# Patient Record
Sex: FEMALE | Race: WHITE | NOT HISPANIC OR LATINO | URBAN - METROPOLITAN AREA
[De-identification: names, ages, dates, MRNs, and addresses within clinical notes are randomized per-mention and may not be internally consistent; named-entity substitution may affect disease eponyms.]

---

## 2023-08-04 ENCOUNTER — TELEPHONE (OUTPATIENT)
Dept: OTOLARYNGOLOGY | Facility: CLINIC | Age: 68
End: 2023-08-04

## 2023-08-04 NOTE — TELEPHONE ENCOUNTER
Phone call to pt.  Left message that referral to Dr Shi for evaluation of nasal obstruction was received and asked pt to call back to discuss and schedule appointment if desired.  Lori Lema RN

## 2023-08-15 NOTE — TELEPHONE ENCOUNTER
Called and scheduled appt 9-7-23 Dr Yuridia Jones [facility]

## 2023-08-17 ENCOUNTER — TRANSCRIBE ORDERS (OUTPATIENT)
Dept: OTHER | Age: 68
End: 2023-08-17

## 2023-08-17 DIAGNOSIS — M95.0 NASAL DEFORMITY, ACQUIRED: Primary | ICD-10-CM

## 2023-09-25 NOTE — PROGRESS NOTES
Facial Plastic and Reconstructive Surgery Consultation  Department of Otolaryngology  [facility]    Clare Burgess  : 1955   MRN: [id number]    Dara Pond MD   [address] [id number]     Dear Doctor Dejah,    Thank you for asking me to see your patient, Ms. Clare Burgess, in consultation to evaluate her nasal obstruction.  Today I had the pleasure of seeing her at the Facial Plastic and Reconstructive Surgery Clinic in the Department of Otolaryngology at [facility].        IMPRESSION & RECOMMENDATIONS  1.) Bilateral (R>L) nasal obstruction from the following anatomic causes: septal deviation to the right  in the region of the mid-septum, nasal valve collapse from right lower lateral cartilage concavity, and turbinate hypertrophy  Other pertinent nasal/facial anatomy: dorsum frontal view: deviate left in the middle and lower 1/3 (nasal bones midline), nasal bone length: short, and tip shape: asymmetric with indentation of the right nasal tip from the LLC concavity  -10/5/23: nasal endoscopy- Right mid septal deviation contacting the lateral nasal wall and creating such a significant obstruction.  Outcomes: NOSE score: 8 on 23.  Medical Decision Making (MDM): (stable chronic problem or illness).   The nasal obstruction results from the anatomic abnormalities described above. I recommend a course of medical therapy using steroid nasal sprays. I have asked Ms. Burgess to follow up with me in 4-6 weeks to update me on her response to medical therapy. If medical treatment proves ineffective, I would recommend nasal surgery to address the anatomic abnormalities described above. Rhinoplasty is a medically necessary component of the planned surgical treatment plan to address the components of her nasal obstruction caused by the external nasal deformity and could not be addressed with septoplasty alone.   Care Checklist:  _Nasal steroid trial, then  follow up in 4-6 weeks.  _Observe nasal function and nasal cycle.  _Administer NOSE evaluation at every visit.   _If medical treatment proves ineffective, operative plan includes: functional septorhinoplasty (CPT 37948) septoplasty, excision of concave right LLC with inversion and alar batten or strut grafting, right  grafting, possible bilateral  grafting, and turbinate reduction (CPT 17422 turbinate out-fracture).  _Conchal cartilage harvest and grafting from either or both ears (CPT 32107) and/or possible temporal or mastoid fascia harvest and grafting from either side (CPT 08550) would only be performed if there were unexpected findings during the nasal surgery and additional grafting materials are necessary to rebuild the nose. But preoperatively, the need for graft harvest and additional grafting is unlikely.   _Surgery is expected to only provide a partial benefit in breathing. Nasal steroid spray use and even further allergy treatment may be needed after surgery to obtain an optimal result.   _Expected outcome: we discussed the expected outcome of a right nasal sidewall that is thicker and firmer that may need additional surgery to thin the grafts if they are determined to be too thick after a reasonable period of healing. We will also try to creat a straighter nose but it will not be perfectly straight. This is a challenge because of her short nasal bones. Palpable irregularities are expected. The patient accepted this expected outcome.     2.) Allegic rhinitis.  Medical Decision Making (MDM): (stable chronic problem or illness).   Allergies likely play a role in her nasal obstruction and that surgery will not improve her allergic rhinitis.   Care Checklist:   _Expected outcome after any nasal surgery: some degree of nasal obstruction will be present postoperatively due to allergies. You will likely need nasal steroid sprays after a successful nasal surgery and may need ongoing care by an  allergist.    Background/Connection:   Preferred name = Hina  What is most important to know about you as a person? (No medical information) I'm a caring person    Photographs: [facility] consents signed 10/5/23.    It has been a pleasure to participate in the care of Ms. Burgess. Thank you for this kind referral.       Sincerely,    Red Shi MD  Facial Plastic and Reconstructive Surgeon   Department of Otolaryngology  [facility]          ___________________________________________  MA/RN Section     NASAL HEALTH QUESTIONNAIRE:     Was an  used during the visit? No      Background/Connection:   Preferred name = Hina  What is most important to know about you as a person? (No medical information) I'm a caring person      When did you first notice your nose problem? 40 years ago.    Bilateral. Is the congestion worse on one side or the other?     Is the congestion constant or intermittent? intermittent.    Prior nasal surgery or trauma:  No. Have you had nose surgery before?   No. Have you broken your nose before?   If yes, when? N/A     Prior nasal treatment:  YES. Have you tried any nose sprays before?   If yes, what was name of the spray? Flonase   When did you start using it? 10 years ago   How long did you use the spray? 14 days   Did the sprays help? Yes, they helped a lot  No. Have you tried breathe-right strips?    Nasal appearance:   YES. Do you have any concerns about the appearance of your nose?   If any concerns are present, document their words: dent on the right side and buldges on the left     Allergies:  YES. Do you have a history of allergies?   YES. Do you have frequent sneezing?  No. Do you have frequent runny nose?   No. Do you have watery or itchy eyes?     Sinusitis:  YES. Do you get sinus infections? How many infections per year? 1-2   No. Do you have facial pain or pressure?   YES. Difficulty with smell or reduced sense of smell?  YES. Post nasal drip or runny nose  down the back of your throat?  No. Tooth pain?  No. Fevers?    Sleep apnea: (STOP test)  No. Snoring: Do you snore loudly (louder than talking or loud enough to be heard through closed doors)?  No. Tired: Do you often feel tired, fatigued, or sleepy during daytime?  No. Observed: Has anyone observed you stop breathing during your sleep?  YES. Blood Pressure: Do you have or are you being treated for high blood pressure?     SURGICAL RISK FACTORS  Do you currently have or have you ever had in the past:    No. Do you currently smoke?  No. Problems with sedation, anesthesia, or surgery.  No. Use blood thinners. If yes, what blood thinner: N/A   No.  Any heart problems.   No. Chest pain.   No. A pacemaker.  No. Problems with excessive bleeding or a bleeding disorder.  No. Problems with blood clots or a clotting disorder.   No. Sleep apnea or sleep with a CPAP machine.  No. Hepatitis.    No. HIV or AIDS.  No. Family history of excessive bleeding or a bleeding disorder?    No. Family history of blood clots or a clotting disorder?    SOCIAL HISTORY: No smoking.   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       ALLERGIES: Bee venom    MEDICATIONS:   Current Outpatient Medications   Medication Sig Dispense Refill    fexofenadine (ALLEGRA) 180 MG tablet Take 180 mg by mouth daily      lisinopril (ZESTRIL) 10 MG tablet Take 10 mg by mouth daily           Nasal Obstruction Symptom Evaluation (NOSE QUESTIONNAIRE):   [Administer the paper survey to the patient called the Nasal Obstruction Symptom Evaluation (NOSE QUESTIONNAIRE), and record the results below:]    3 - a fairly bad problem: Nasal congestion or stuffiness?  1 - a very mild problem: Nasal blockage or obstruction?  1 - a very mild problem: Trouble breathing through her nose?  2 - a moderate problem: Trouble sleeping?  1 - a very mild problem: Inability to get enough air through her nose during exercise or exertion?    Add up the  "NOSE Score:  8 = Total NOSE score        Signature: Angely Jones, CMA        _____________________________________________________________  Dr. Shi Section     MA/RN transcription of patient information: I reviewed the information the MA/RN transcribed from the patient that is documented above.  Signature: Red Shi MD       HISTORY OF PRESENT ILLNESS:  As you know, Ms. Burgess is an 67 year old-year-old female who presents with nasal congestion.     In 2023, she had diffuse redness of her nose with swelling and mild tenderness. She saw an ENT right away and who diagnosed a bacterial infection. She took antibiotics and the infection resolved and has not returned.     She first noticed nasal obstruction or congestion over 40 years ago.    Bilateral (R>L). Is the congestion worse on one side or the other?  YES. Allergic symptoms?    No. Previous nasal surgery? If yes, what surgery and when? N/A   No. Previous nasal trauma? If yes, what happened and when? N/A     YES. Previous nasal steroid spray treatment?  If yes, what was name of the spray? flonase many years ago.     YES. Do you have any concerns about the appearance of your nose?   If any concerns are present, document their words: \"it looks like there is something on my nose because of the dent.\"      No. Concern for sleep apnea?    Other pertinent history: None      PAST MEDICAL HISTORY:  HTN    PAST SURGICAL HISTORY:   x 3. Kidney stone surgery.     SOCIAL HISTORY:   Social History     Tobacco Use    Smoking status: Not on file    Smokeless tobacco: Not on file   Substance Use Topics    Alcohol use: Not on file       ALLERGIES:   Patient has no allergy information on record.    MEDICATIONS:   Current Outpatient Medications   Medication Sig Dispense Refill    fexofenadine (ALLEGRA) 180 MG tablet Take 180 mg by mouth daily      lisinopril (ZESTRIL) 10 MG tablet Take 10 mg by mouth daily           PHYSICAL EXAMINATION:    A " standard nasal exam was performed. Pertinent findings are documented in the impression and recommendations section above. Pertinent negative findings on exam include:  No: septal perforation  No: nasal polyps  No: nasal mass  No: bleeding  No: purulence    Standard nasal and facial aesthetics were observed including skin quality, projection, rotation, forehead and chin position with the exceptions noted here or in the impression and recommendations section: asymmetries of the nostrils and face are present.     Modified Rhea maneuver: the patient reported significant improvement in breathing with lateralization of the right  external nasal valve at the maximal concavity of her right LLC.    Remainder of physical exam:  General: Pleasant affect, normal ability to communicate  Oral cavity/oropharynx: Normal mouth opening. No OC/OP masses.  Respiratory: NAD, no stridor, voice strong      Procedure: Nasal endoscopy.   Indications: examine for posterior nasal cavity causes of nasal obstruction.  The nose was topically decongested and anesthestized with a compounded spray of 3% lidocaine and 0.25% phenylephrine through both nostrils. The nasal endoscope was passed under endoscopic vision. Normal middle turbinate architecture. No polyps or purulence. No septal perforation. No significant adenoid tissue. Eustachian tube orifices clear. No masses or lesions. Right mid septal deviation contacting the lateral nasal wall and creating such a significant obstruction it is very difficult to pass the scope posterior to the deviation.    Signature: Red Shi MD

## 2023-10-05 ENCOUNTER — OFFICE VISIT (OUTPATIENT)
Dept: OTOLARYNGOLOGY | Facility: CLINIC | Age: 68
End: 2023-10-05
Payer: COMMERCIAL

## 2023-10-05 VITALS — SYSTOLIC BLOOD PRESSURE: 154 MMHG | DIASTOLIC BLOOD PRESSURE: 82 MMHG | HEART RATE: 91 BPM

## 2023-10-05 DIAGNOSIS — M95.0 NASAL DEFORMITY, ACQUIRED: ICD-10-CM

## 2023-10-05 DIAGNOSIS — J34.89 NASAL OBSTRUCTION: Primary | ICD-10-CM

## 2023-10-05 PROCEDURE — 31231 NASAL ENDOSCOPY DX: CPT | Performed by: OTOLARYNGOLOGY

## 2023-10-05 PROCEDURE — 99244 OFF/OP CNSLTJ NEW/EST MOD 40: CPT | Mod: 25 | Performed by: OTOLARYNGOLOGY

## 2023-10-05 RX ORDER — LISINOPRIL 10 MG/1
10 TABLET ORAL DAILY
COMMUNITY

## 2023-10-05 RX ORDER — FLUTICASONE PROPIONATE 50 MCG
1 SPRAY, SUSPENSION (ML) NASAL DAILY
Qty: 16 G | Refills: 4 | Status: SHIPPED | OUTPATIENT
Start: 2023-10-05 | End: 2023-10-11

## 2023-10-05 RX ORDER — FEXOFENADINE HCL 180 MG/1
180 TABLET ORAL DAILY
COMMUNITY

## 2023-10-05 NOTE — Clinical Note
Please send letter in progress note section  to referring physician and PCP with appropriate letterhead.  Dara Pond MD  [address] [address] [facility]  [place] [id number]

## 2023-10-05 NOTE — LETTER
10/5/2023         RE: Clare Burgess  [address] [place] [address]        Dear Colleague,    Thank you for referring your patient, Clare Burgess, to the [facility]. Please see a copy of my visit note below.    Facial Plastic and Reconstructive Surgery Consultation  Department of Otolaryngology  [facility]    Clare Burgess  : 1955   MRN: [id number]    Dara Pond MD   [address] [place] [address]     Dear Doctor Dejah,    Thank you for asking me to see your patient, Ms. Clare Burgess, in consultation to evaluate her nasal obstruction.  Today I had the pleasure of seeing her at the Facial Plastic and Reconstructive Surgery Clinic in the Department of Otolaryngology at [facility].        IMPRESSION & RECOMMENDATIONS  1.) Bilateral (R>L) nasal obstruction from the following anatomic causes: septal deviation to the right  in the region of the mid-septum, nasal valve collapse from right lower lateral cartilage concavity, and turbinate hypertrophy  Other pertinent nasal/facial anatomy: dorsum frontal view: deviate left in the middle and lower 1/3 (nasal bones midline), nasal bone length: short, and tip shape: asymmetric with indentation of the right nasal tip from the LLC concavity  -10/5/23: nasal endoscopy- Right mid septal deviation contacting the lateral nasal wall and creating such a significant obstruction.  Outcomes: NOSE score: 8 on 23.  Medical Decision Making (MDM): (stable chronic problem or illness).   The nasal obstruction results from the anatomic abnormalities described above. I recommend a course of medical therapy using steroid nasal sprays. I have asked Ms. Burgess to follow up with me in 4-6 weeks to update me on her response to medical therapy. If medical treatment proves ineffective, I would recommend nasal surgery to address the anatomic abnormalities described above.  Rhinoplasty is a medically necessary component of the planned surgical treatment plan to address the components of her nasal obstruction caused by the external nasal deformity and could not be addressed with septoplasty alone.   Care Checklist:  _Nasal steroid trial, then follow up in 4-6 weeks.  _Observe nasal function and nasal cycle.  _Administer NOSE evaluation at every visit.   _If medical treatment proves ineffective, operative plan includes: functional septorhinoplasty (CPT 46220) septoplasty, excision of concave right LLC with inversion and alar batten or strut grafting, right  grafting, possible bilateral  grafting, and turbinate reduction (CPT 58243 turbinate out-fracture).  _Conchal cartilage harvest and grafting from either or both ears (CPT 50928) and/or possible temporal or mastoid fascia harvest and grafting from either side (CPT 94639) would only be performed if there were unexpected findings during the nasal surgery and additional grafting materials are necessary to rebuild the nose. But preoperatively, the need for graft harvest and additional grafting is unlikely.   _Surgery is expected to only provide a partial benefit in breathing. Nasal steroid spray use and even further allergy treatment may be needed after surgery to obtain an optimal result.   _Expected outcome: we discussed the expected outcome of a right nasal sidewall that is thicker and firmer that may need additional surgery to thin the grafts if they are determined to be too thick after a reasonable period of healing. We will also try to creat a straighter nose but it will not be perfectly straight. This is a challenge because of her short nasal bones. Palpable irregularities are expected. The patient accepted this expected outcome.     2.) Allegic rhinitis.  Medical Decision Making (MDM): (stable chronic problem or illness).   Allergies likely play a role in her nasal obstruction and that surgery will not improve her  allergic rhinitis.   Care Checklist:   _Expected outcome after any nasal surgery: some degree of nasal obstruction will be present postoperatively due to allergies. You will likely need nasal steroid sprays after a successful nasal surgery and may need ongoing care by an allergist.    Background/Connection:   Preferred name = Hina  What is most important to know about you as a person? (No medical information) I'm a caring person    Photographs: [facility] consents signed 10/5/23.    It has been a pleasure to participate in the care of Ms. Burgess. Thank you for this kind referral.       Sincerely,    Red Shi MD  Facial Plastic and Reconstructive Surgeon   Department of Otolaryngology  [facility]          ___________________________________________  MA/RN Section     NASAL HEALTH QUESTIONNAIRE:     Was an  used during the visit? No      Background/Connection:   Preferred name = Hina  What is most important to know about you as a person? (No medical information) I'm a caring person      When did you first notice your nose problem? 40 years ago.    Bilateral. Is the congestion worse on one side or the other?     Is the congestion constant or intermittent? intermittent.    Prior nasal surgery or trauma:  No. Have you had nose surgery before?   No. Have you broken your nose before?   If yes, when? N/A     Prior nasal treatment:  YES. Have you tried any nose sprays before?   If yes, what was name of the spray? Flonase   When did you start using it? 10 years ago   How long did you use the spray? 14 days   Did the sprays help? Yes, they helped a lot  No. Have you tried breathe-right strips?    Nasal appearance:   YES. Do you have any concerns about the appearance of your nose?   If any concerns are present, document their words: dent on the right side and buldges on the left     Allergies:  YES. Do you have a history of allergies?   YES. Do you have frequent sneezing?  No. Do you have frequent  runny nose?   No. Do you have watery or itchy eyes?     Sinusitis:  YES. Do you get sinus infections? How many infections per year? 1-2   No. Do you have facial pain or pressure?   YES. Difficulty with smell or reduced sense of smell?  YES. Post nasal drip or runny nose down the back of your throat?  No. Tooth pain?  No. Fevers?    Sleep apnea: (STOP test)  No. Snoring: Do you snore loudly (louder than talking or loud enough to be heard through closed doors)?  No. Tired: Do you often feel tired, fatigued, or sleepy during daytime?  No. Observed: Has anyone observed you stop breathing during your sleep?  YES. Blood Pressure: Do you have or are you being treated for high blood pressure?     SURGICAL RISK FACTORS  Do you currently have or have you ever had in the past:    No. Do you currently smoke?  No. Problems with sedation, anesthesia, or surgery.  No. Use blood thinners. If yes, what blood thinner: N/A   No.  Any heart problems.   No. Chest pain.   No. A pacemaker.  No. Problems with excessive bleeding or a bleeding disorder.  No. Problems with blood clots or a clotting disorder.   No. Sleep apnea or sleep with a CPAP machine.  No. Hepatitis.    No. HIV or AIDS.  No. Family history of excessive bleeding or a bleeding disorder?    No. Family history of blood clots or a clotting disorder?    SOCIAL HISTORY: No smoking.   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       ALLERGIES: Bee venom    MEDICATIONS:   Current Outpatient Medications   Medication Sig Dispense Refill     fexofenadine (ALLEGRA) 180 MG tablet Take 180 mg by mouth daily       lisinopril (ZESTRIL) 10 MG tablet Take 10 mg by mouth daily           Nasal Obstruction Symptom Evaluation (NOSE QUESTIONNAIRE):   [Administer the paper survey to the patient called the Nasal Obstruction Symptom Evaluation (NOSE QUESTIONNAIRE), and record the results below:]    3 - a fairly bad problem: Nasal congestion or stuffiness?  1 - a  "very mild problem: Nasal blockage or obstruction?  1 - a very mild problem: Trouble breathing through her nose?  2 - a moderate problem: Trouble sleeping?  1 - a very mild problem: Inability to get enough air through her nose during exercise or exertion?    Add up the NOSE Score:  8 = Total NOSE score        Signature: Angely ANTUNEZDima Robert, [facility]        _____________________________________________________________  Dr. Shi Section     MA/RN transcription of patient information: I reviewed the information the MA/RN transcribed from the patient that is documented above.  Signature: Red Shi MD       HISTORY OF PRESENT ILLNESS:  As you know, Ms. Burgess is an 67 year old-year-old female who presents with nasal congestion.     In 2023, she had diffuse redness of her nose with swelling and mild tenderness. She saw an ENT right away and who diagnosed a bacterial infection. She took antibiotics and the infection resolved and has not returned.     She first noticed nasal obstruction or congestion over 40 years ago.    Bilateral (R>L). Is the congestion worse on one side or the other?  YES. Allergic symptoms?    No. Previous nasal surgery? If yes, what surgery and when? N/A   No. Previous nasal trauma? If yes, what happened and when? N/A     YES. Previous nasal steroid spray treatment?  If yes, what was name of the spray? flonase many years ago.     YES. Do you have any concerns about the appearance of your nose?   If any concerns are present, document their words: \"it looks like there is something on my nose because of the dent.\"      No. Concern for sleep apnea?    Other pertinent history: None      PAST MEDICAL HISTORY:  HTN    PAST SURGICAL HISTORY:   x 3. Kidney stone surgery.     SOCIAL HISTORY:   Social History     Tobacco Use     Smoking status: Not on file     Smokeless tobacco: Not on file   Substance Use Topics     Alcohol use: Not on file       ALLERGIES:   Patient has no allergy " information on record.    MEDICATIONS:   Current Outpatient Medications   Medication Sig Dispense Refill     fexofenadine (ALLEGRA) 180 MG tablet Take 180 mg by mouth daily       lisinopril (ZESTRIL) 10 MG tablet Take 10 mg by mouth daily           PHYSICAL EXAMINATION:    A standard nasal exam was performed. Pertinent findings are documented in the impression and recommendations section above. Pertinent negative findings on exam include:  No: septal perforation  No: nasal polyps  No: nasal mass  No: bleeding  No: purulence    Standard nasal and facial aesthetics were observed including skin quality, projection, rotation, forehead and chin position with the exceptions noted here or in the impression and recommendations section: asymmetries of the nostrils and face are present.     Modified Jeff maneuver: the patient reported significant improvement in breathing with lateralization of the right  external nasal valve at the maximal concavity of her right LLC.    Remainder of physical exam:  General: Pleasant affect, normal ability to communicate  Oral cavity/oropharynx: Normal mouth opening. No OC/OP masses.  Respiratory: NAD, no stridor, voice strong      Procedure: Nasal endoscopy.   Indications: examine for posterior nasal cavity causes of nasal obstruction.  The nose was topically decongested and anesthestized with a compounded spray of 3% lidocaine and 0.25% phenylephrine through both nostrils. The nasal endoscope was passed under endoscopic vision. Normal middle turbinate architecture. No polyps or purulence. No septal perforation. No significant adenoid tissue. Eustachian tube orifices clear. No masses or lesions. Right mid septal deviation contacting the lateral nasal wall and creating such a significant obstruction it is very difficult to pass the scope posterior to the deviation.    Signature: Red Shi MD       Again, thank you for allowing me to participate in the care of your patient.         Sincerely,        Red Shi MD

## 2023-10-05 NOTE — NURSING NOTE
Clare Burgess's chief complaint for this visit includes:  Chief Complaint   Patient presents with    Consult     Nasal obstruction     PCP: Alton Canela    Referring Provider:  No referring provider defined for this encounter.    BP (!) 154/82   Pulse 91

## 2023-10-10 DIAGNOSIS — J34.89 NASAL OBSTRUCTION: ICD-10-CM

## 2023-10-11 RX ORDER — FLUTICASONE PROPIONATE 50 MCG
1 SPRAY, SUSPENSION (ML) NASAL DAILY
Qty: 24 ML | Refills: 4 | Status: SHIPPED | OUTPATIENT
Start: 2023-10-11